# Patient Record
Sex: MALE | Race: WHITE
[De-identification: names, ages, dates, MRNs, and addresses within clinical notes are randomized per-mention and may not be internally consistent; named-entity substitution may affect disease eponyms.]

---

## 2018-01-05 ENCOUNTER — HOSPITAL ENCOUNTER (EMERGENCY)
Dept: HOSPITAL 74 - FER | Age: 23
Discharge: HOME | End: 2018-01-05
Payer: COMMERCIAL

## 2018-01-05 VITALS — DIASTOLIC BLOOD PRESSURE: 75 MMHG | TEMPERATURE: 97.6 F | HEART RATE: 78 BPM | SYSTOLIC BLOOD PRESSURE: 127 MMHG

## 2018-01-05 VITALS — BODY MASS INDEX: 23.7 KG/M2

## 2018-01-05 DIAGNOSIS — J02.8: Primary | ICD-10-CM

## 2018-01-05 DIAGNOSIS — B97.89: ICD-10-CM

## 2018-01-05 DIAGNOSIS — K21.9: ICD-10-CM

## 2018-01-05 DIAGNOSIS — F17.210: ICD-10-CM

## 2018-01-05 NOTE — PDOC
History of Present Illness





- General


Chief Complaint: Sore Throat


Stated Complaint: SORE THROAT


Time Seen by Provider: 01/05/18 10:47


History Source: Patient


Exam Limitations: No Limitations





- History of Present Illness


Initial Comments: 





01/05/18 11:07


This is a 22-year-old male who comes in complaining of some upper respiratory 

tract symptoms and sore throat 2 days. Patient denies any fevers. Patient 

denies any rashes. Patient said he was unable to go to work today because of 

his symptoms. Patient is otherwise healthy.





PAST MEDICAL HISTORY:  no significant history





PAST SURGICAL HISTORY:  no significant history





FAMILY HISTORY:  no pertinant history





SOCIAL HISTORY:  Pt lives with  family and is employed.





MEDICATIONS:  reviewed





ALLERGIES:  As per nursing notes





Review of Systems


General:  No fevers or chills, no weakness, no weight loss 


HEENT: No change in vision.  + sore throat,. No ear pain


CardioVascular:  No chest pain or shortness of breath


Respiratory:No cough, or wheezing. 


Gastrointestinal:  no nausea, vomitting, diarrhea or constipation,  No rectal 

bleeding


Genitourinary:  No dysuria, hematuria, or frequency


Musculoskeletal:  No joint or muscle pain or swelling


Neurologic: No headache, vertigo, dizziness or loss of consciousness


Psychiatric: nor depression 


Skin: No rashes or easy bruising


Endocrine: no increased thirst or abnormal weight change


Allergic: no skin or latex allergy


All other systems reviewed and normal








GENERAL: The patient is awake, alert, and fully oriented, in no acute distress.


HEAD: Normal with no signs of trauma.


THROAT: Tonsils are mildly enlarged with some mild erythema, there is no 

vesicular lesions, there is no exudate.


NECK: Neck is supple with no meningeal signs, there is no lymphadenopathy.


NOSE: There is some mild nasal congestion with clear nasal discharge


EYES: Pupils equal, round and reactive to light, extraocular movements intact, 

sclera anicteric, conjunctiva clear.


EXTREMITIES: Normal range of motion, no edema.


NEUROLOGICAL: Normal speech, normal gait. grossly intact 


PSYCH: Normal mood, normal affect.


SKIN: Warm, Dry, normal turgor, no rashes or lesions noted.





Assessment and plan: This is a 22-year-old male who comes in with upper 

respiratory tract type symptoms and a sore throat. Patient was reassured that 

it is viral in nature as he does not have a fever and there is no 

lymphadenopathy. Patient told to take Tylenol or Motrin for the pain and 

symptoms and follow-up with his doctor. Patient given a note for no work until 

Monday





Past History





- Past Medical History


Allergies/Adverse Reactions: 


 Allergies











Allergy/AdvReac Type Severity Reaction Status Date / Time


 


No Known Allergies Allergy   Unverified 01/05/18 10:39











Home Medications: 


Ambulatory Orders





NK [No Known Home Medication]  01/05/18 








COPD: No


GI Disorders: Yes (ACID REFLUX)





- Suicide/Smoking/Psychosocial Hx


Smoking History: Current some day smoker


Have you smoked in the past 12 months: Yes


Number of Cigarettes Smoked Daily: 1


Information on smoking cessation initiated: Yes


'Breaking Loose' booklet given: 01/05/18


Hx Alcohol Use:  (social)





*Physical Exam





- Vital Signs


 Last Vital Signs











Temp Pulse Resp BP Pulse Ox


 


 97.6 F   78   18   127/75   100 


 


 01/05/18 10:35  01/05/18 10:35  01/05/18 10:35  01/05/18 10:35  01/05/18 10:35














*DC/Admit/Observation/Transfer


Diagnosis at time of Disposition: 


 Viral pharyngitis








- Discharge Dispostion


Disposition: HOME


Condition at time of disposition: Stable


Admit: No





- Referrals





- Patient Instructions


Additional Instructions: 


Tylenol or Motrin as needed for pain.





Return to the emergency department immediately with ANY new, persistent or 

worsening symptoms.





Continue any medications as previously prescribed by your physician.





You should follow up with your primary doctor as soon as possible regarding 

today's emergency department visit.


.


Please make sure your doctor reviews the results of your emergency evaluation.





Thank you for coming to the   Emergency Department today for your care. It was 

a pleasure to see you today. Please note that your evaluation is INCOMPLETE 

until you  follow-up with your doctor. 











- Post Discharge Activity


Forms/Work/School Notes:  Back to Work

## 2018-02-14 ENCOUNTER — HOSPITAL ENCOUNTER (EMERGENCY)
Dept: HOSPITAL 74 - FER | Age: 23
Discharge: HOME | End: 2018-02-14
Payer: COMMERCIAL

## 2018-02-14 VITALS — DIASTOLIC BLOOD PRESSURE: 46 MMHG | SYSTOLIC BLOOD PRESSURE: 130 MMHG | HEART RATE: 90 BPM | TEMPERATURE: 98.9 F

## 2018-02-14 VITALS — BODY MASS INDEX: 23.7 KG/M2

## 2018-02-14 DIAGNOSIS — J06.9: Primary | ICD-10-CM

## 2018-02-14 DIAGNOSIS — F17.210: ICD-10-CM

## 2018-02-14 DIAGNOSIS — B97.89: ICD-10-CM

## 2018-02-14 DIAGNOSIS — R05: ICD-10-CM

## 2018-02-14 DIAGNOSIS — K21.9: ICD-10-CM

## 2018-02-14 NOTE — PDOC
History of Present Illness





- General


History Source: Patient, Friend


Exam Limitations: No Limitations





- History of Present Illness


Initial Comments: 





02/14/18 16:58


The patient is a 22 year old male, with no significant past medical history, 

who presents to the emergency department with, approx. 3 days of dry cough. The 

patient reports mild chest discomfort secondary to the cough. He denies any 

recent fevers, chills, headache or dizziness. He denies any recent nausea, vomit

, diarrhea or constipation. He denies any recent chest pain or shortness of 

breath. 





Allergies: NKA


Past surgical history: None reported.


Social History: Nonsmoker. Denies EtOH use and recreational drug use. 








<Sandoval Patel - Last Filed: 02/14/18 16:58>





- History of Present Illness


Initial Comments: 








02/14/18 17:05


Physical exam: Alert oriented well-developed well-nourished no acute distress. 

No dyspnea or tachypnea


Afebrile, vital signs normal


HEENT clear


Neck supple without bruit mass or nodes


Lungs clear with full breath sounds throughout bilaterally. No wheezes rales or 

rhonchi


CV without murmur rub or gallop


Abdomen benign


Skin clear, no rash, adequate turgor and wet mucous membranes





Impression: Viral URI with cough. No sign of pneumonia





Plan: Symptomatic treatment, rest and follow-up if symptoms worsen. Fully 

ambulatory and in no distress upon discharge with family to follow-up as 

recommended





<Massimo Nguyen - Last Filed: 02/14/18 17:06>





- General


Chief Complaint: Pain


Stated Complaint: COUGHING CHEST HURTS





Past History





<Sandoval Patel - Last Filed: 02/14/18 16:58>





- Past Medical History


COPD: No


GI Disorders: Yes (ACID REFLUX)





- Suicide/Smoking/Psychosocial Hx


Smoking History: Current some day smoker


Have you smoked in the past 12 months: Yes


Number of Cigarettes Smoked Daily: 1


'Breaking Loose' booklet given: 01/05/18


Hx Alcohol Use:  (social)





<Massimo Nguyen - Last Filed: 02/14/18 17:06>





- Past Medical History


Allergies/Adverse Reactions: 


 Allergies











Allergy/AdvReac Type Severity Reaction Status Date / Time


 


No Known Allergies Allergy   Verified 02/14/18 16:36











Home Medications: 


Ambulatory Orders





Guaifenesin AC [Robitussin-AC] 1 - 2 tsp PO Q4HWA PRN #120 ml MDD 8 02/14/18 


Ibuprofen 600 mg PO QID #20 tablet 02/14/18 











**Review of Systems





- Review of Systems


Comments:: 





02/14/18 16:59


GENERAL/CONSTITUTIONAL: No fever or chills. No weakness.


HEAD, EYES, EARS, NOSE AND THROAT: No change in vision. No ear pain or 

discharge. No sore throat.


CARDIOVASCULAR: No chest pain or shortness of breath.


RESPIRATORY: +Dry cough. No wheezing, or hemoptysis.


GASTROINTESTINAL: No nausea, vomiting, diarrhea or constipation.


GENITOURINARY: No dysuria, frequency, or change in urination.


MUSCULOSKELETAL: No joint or muscle swelling or pain. No neck or back pain.


SKIN: No rash


NEUROLOGIC: No headache, vertigo, loss of consciousness, or change in strength/

sensation.


ENDOCRINE: No increased thirst. No abnormal weight change.


HEMATOLOGIC/LYMPHATIC: No anemia, easy bleeding, or history of blood clots.


ALLERGIC/IMMUNOLOGIC: No hives or skin allergy.








<Sandoval Patel - Last Filed: 02/14/18 16:58>





*Physical Exam





- Vital Signs


 Last Vital Signs











Temp Pulse Resp BP Pulse Ox


 


 98.9 F   90   18   130/46   98 


 


 02/14/18 16:36  02/14/18 16:36  02/14/18 16:36  02/14/18 16:36  02/14/18 16:36














<Sandoval Patel - Last Filed: 02/14/18 16:58>





*DC/Admit/Observation/Transfer





- Attestations


Scribe Attestion: 





02/14/18 16:59





Documentation prepared by Sandoval Patel, acting as medical scribe for Massimo Nguyen MD.





<Sandoval Patel - Last Filed: 02/14/18 16:58>





- Discharge Dispostion


Admit: No





<Massimo Nguyen - Last Filed: 02/14/18 17:06>


Diagnosis at time of Disposition: 


 Viral URI with cough








- Discharge Dispostion


Disposition: HOME


Condition at time of disposition: Stable





- Prescriptions


Prescriptions: 


Guaifenesin AC [Robitussin-AC] 1 - 2 tsp PO Q4HWA PRN #120 ml MDD 8


 PRN Reason: Cough


Ibuprofen 600 mg PO QID #20 tablet





- Referrals


Referrals: 


Lilian Cerna MD [Staff Physician] - 





- Patient Instructions


Printed Discharge Instructions:  DI for Viral Upper Respiratory Infection -- 

Adult


Additional Instructions: 


Stay home, rest, medication as directed, recheck if there is fever, shortness 

of breath or other difficulty breathing or swallowing.





- Post Discharge Activity


Forms/Work/School Notes:  Back to Work